# Patient Record
Sex: MALE | Race: WHITE | Employment: STUDENT | URBAN - METROPOLITAN AREA
[De-identification: names, ages, dates, MRNs, and addresses within clinical notes are randomized per-mention and may not be internally consistent; named-entity substitution may affect disease eponyms.]

---
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
## 2022-05-19 ENCOUNTER — HOSPITAL ENCOUNTER (OUTPATIENT)
Age: 22
Discharge: HOME | End: 2022-05-19
Payer: COMMERCIAL

## 2022-05-19 VITALS
TEMPERATURE: 98 F | SYSTOLIC BLOOD PRESSURE: 137 MMHG | DIASTOLIC BLOOD PRESSURE: 68 MMHG | OXYGEN SATURATION: 100 % | HEART RATE: 76 BPM | RESPIRATION RATE: 16 BRPM

## 2022-05-19 DIAGNOSIS — R05.9 COUGH: ICD-10-CM

## 2022-05-19 DIAGNOSIS — J02.9 SORE THROAT: Primary | ICD-10-CM

## 2022-05-19 LAB
S PYO AG THROAT QL: NEGATIVE
SARS-COV-2 RNA RESP QL NAA+PROBE: NOT DETECTED

## 2022-05-19 PROCEDURE — 87880 STREP A ASSAY W/OPTIC: CPT

## 2022-05-19 PROCEDURE — 99203 OFFICE O/P NEW LOW 30 MIN: CPT

## 2022-05-19 RX ORDER — BENZONATATE 100 MG/1
100 CAPSULE ORAL 3 TIMES DAILY PRN
Qty: 30 CAPSULE | Refills: 0 | Status: SHIPPED | OUTPATIENT
Start: 2022-05-19 | End: 2022-05-26

## 2022-05-19 NOTE — ED INITIAL ASSESSMENT (HPI)
Pt comes in requesting to be tested for strep. Reports sore throat x2 days. Also reports some mild cough, chills and nasal congestion. Denies HA, n/v, fever.

## 2022-10-13 ENCOUNTER — APPOINTMENT (OUTPATIENT)
Dept: OTHER | Facility: HOSPITAL | Age: 22
End: 2022-10-13
Attending: EMERGENCY MEDICINE

## 2024-10-04 ENCOUNTER — HOSPITAL ENCOUNTER (EMERGENCY)
Age: 24
Discharge: HOME | End: 2024-10-04

## 2024-10-04 ENCOUNTER — WALK IN (OUTPATIENT)
Dept: URGENT CARE | Age: 24
End: 2024-10-04

## 2024-10-04 VITALS
WEIGHT: 185.41 LBS | RESPIRATION RATE: 18 BRPM | SYSTOLIC BLOOD PRESSURE: 125 MMHG | TEMPERATURE: 98.2 F | HEIGHT: 67 IN | HEART RATE: 72 BPM | BODY MASS INDEX: 29.1 KG/M2 | OXYGEN SATURATION: 99 % | DIASTOLIC BLOOD PRESSURE: 77 MMHG

## 2024-10-04 DIAGNOSIS — S51.812A LACERATION OF LEFT FOREARM, INITIAL ENCOUNTER: Primary | ICD-10-CM

## 2024-10-04 PROCEDURE — 90715 TDAP VACCINE 7 YRS/> IM: CPT | Performed by: PHYSICIAN ASSISTANT

## 2024-10-04 PROCEDURE — 90471 IMMUNIZATION ADMIN: CPT | Performed by: PHYSICIAN ASSISTANT

## 2024-10-04 PROCEDURE — 12002 RPR S/N/AX/GEN/TRNK2.6-7.5CM: CPT | Performed by: PHYSICIAN ASSISTANT

## 2024-10-04 PROCEDURE — 99282 EMERGENCY DEPT VISIT SF MDM: CPT

## 2024-10-04 PROCEDURE — 10002800 HB RX 250 W HCPCS: Performed by: PHYSICIAN ASSISTANT

## 2024-10-04 PROCEDURE — 99283 EMERGENCY DEPT VISIT LOW MDM: CPT | Performed by: PHYSICIAN ASSISTANT

## 2024-10-04 RX ADMIN — TETANUS TOXOID, REDUCED DIPHTHERIA TOXOID AND ACELLULAR PERTUSSIS VACCINE, ADSORBED 0.5 ML: 5; 2.5; 8; 8; 2.5 SUSPENSION INTRAMUSCULAR at 11:38

## 2024-10-04 ASSESSMENT — ENCOUNTER SYMPTOMS
EYE REDNESS: 0
HEADACHES: 0
CHEST TIGHTNESS: 0
PHOTOPHOBIA: 0
WOUND: 1
ABDOMINAL PAIN: 0
FATIGUE: 0
AGITATION: 0
FEVER: 0
BACK PAIN: 0
VOMITING: 0
WHEEZING: 0
WEAKNESS: 0
LIGHT-HEADEDNESS: 0
DIARRHEA: 0
ENDOCRINE NEGATIVE: 1
SORE THROAT: 0
NAUSEA: 0
EYE PAIN: 0
SHORTNESS OF BREATH: 0
ADENOPATHY: 0
CONFUSION: 0
COUGH: 0
STRIDOR: 0
ABDOMINAL DISTENTION: 0

## 2024-10-04 ASSESSMENT — PAIN SCALES - GENERAL: PAINLEVEL_OUTOF10: 3

## (undated) NOTE — ED AVS SNAPSHOT
Meme Dodson   MRN: [id number]    Department:  [facility] Emergency Department   Date of Visit:  2/27/2018           Disclosure     Insurance plans vary and the physician(s) referred by the ER may not be covered by your plan.  Please contact CARE PHYSICIAN AT ONCE OR RETURN IMMEDIATELY TO THE EMERGENCY DEPARTMENT. If you have been prescribed any medication(s), please fill your prescription right away and begin taking the medication(s) as directed.   If you believe that any of the medications

## (undated) NOTE — LETTER
Date & Time: 5/19/2022, 10:14 AM  Patient: hSelbie Quinn III  Encounter Provider(s):    Radha Hutchins PA-C       To Whom It May Concern:    Cheryle Franklin was seen and treated in our department on 5/19/2022. He can return to work 5/20/2022.     If you have any questions or concerns, please do not hesitate to call.        _____________________________  Physician/APC Signature

## (undated) NOTE — ED AVS SNAPSHOT
[facility] AND [facility] in [address] [address] [place]    Phone:  [phone]    Fax:  [phone]           Haritha Davis   MRN: [id number]    Department:  [facility] AND [facility] in [address] [place] [address]   Date of Visit: benefit level being available to you or other limited reimbursement. The physician may seek payment directly from you for amounts other than your deductible, co-payment, or co-insurance and for other services not covered under your health insurance plan. If you believe that any of the medications or instructions on this list is different from what your Primary Care doctor has instructed you - please continue to take your medications as instructed by your Primary Care doctor until you can check with your do can help with your Affordable Care Act coverage, as well as all types of Medicaid plans. To get signed up and covered, please call [phone] and ask to get set up for an insurance coverage that is in-network with [employer]

## (undated) NOTE — LETTER
Date & Time: 10/31/2018, 6:09 PM  Patient: Shukri Bay  Encounter Provider(s):    Hao Valdes MD       To Whom It May Concern:    Vaishali Milton was seen and treated in our department on 10/31/2018.  He should not participate in gym/sports until